# Patient Record
Sex: FEMALE | Race: BLACK OR AFRICAN AMERICAN | NOT HISPANIC OR LATINO | URBAN - METROPOLITAN AREA
[De-identification: names, ages, dates, MRNs, and addresses within clinical notes are randomized per-mention and may not be internally consistent; named-entity substitution may affect disease eponyms.]

---

## 2022-01-11 ENCOUNTER — LAB VISIT (OUTPATIENT)
Dept: PRIMARY CARE CLINIC | Facility: CLINIC | Age: 52
End: 2022-01-11
Payer: MEDICAID

## 2022-01-11 DIAGNOSIS — Z20.822 CONTACT WITH AND (SUSPECTED) EXPOSURE TO COVID-19: ICD-10-CM

## 2022-01-11 LAB
CTP QC/QA: YES
SARS-COV-2 AG RESP QL IA.RAPID: NEGATIVE

## 2022-01-11 PROCEDURE — 87811 SARS-COV-2 COVID19 W/OPTIC: CPT

## 2023-04-04 ENCOUNTER — TELEPHONE (OUTPATIENT)
Dept: DIABETES | Facility: CLINIC | Age: 53
End: 2023-04-04
Payer: MEDICAID

## 2023-04-18 ENCOUNTER — TELEPHONE (OUTPATIENT)
Dept: DIABETES | Facility: CLINIC | Age: 53
End: 2023-04-18
Payer: MEDICAID

## 2023-04-18 NOTE — TELEPHONE ENCOUNTER
Pt stated she needed to schedule with an endocrinologist, provided pt with main campus phone number  to schedule

## 2023-11-12 ENCOUNTER — PATIENT MESSAGE (OUTPATIENT)
Dept: DIABETES | Facility: CLINIC | Age: 53
End: 2023-11-12
Payer: MEDICAID